# Patient Record
Sex: MALE | Race: OTHER | HISPANIC OR LATINO | URBAN - METROPOLITAN AREA
[De-identification: names, ages, dates, MRNs, and addresses within clinical notes are randomized per-mention and may not be internally consistent; named-entity substitution may affect disease eponyms.]

---

## 2024-01-19 VITALS — HEIGHT: 66 IN | WEIGHT: 240.08 LBS

## 2024-01-19 RX ORDER — CHLORHEXIDINE GLUCONATE 213 G/1000ML
1 SOLUTION TOPICAL ONCE
Refills: 0 | Status: DISCONTINUED | OUTPATIENT
Start: 2024-01-23 | End: 2024-01-24

## 2024-01-19 RX ORDER — SODIUM CHLORIDE 9 MG/ML
1000 INJECTION INTRAMUSCULAR; INTRAVENOUS; SUBCUTANEOUS
Refills: 0 | Status: DISCONTINUED | OUTPATIENT
Start: 2024-01-23 | End: 2024-01-24

## 2024-01-19 NOTE — H&P ADULT - NSHPLABSRESULTS_GEN_ALL_CORE
14.1   11.17 )-----------( 208      ( 23 Jan 2024 07:44 )             43.9       01-23    140  |  102  |  19  ----------------------------<  182<H>  3.7   |  29  |  1.04    Ca    9.5      23 Jan 2024 07:43  Mg     1.7     01-23    TPro  7.1  /  Alb  3.7  /  TBili  1.2  /  DBili  x   /  AST  28  /  ALT  31  /  AlkPhos  97  01-23      PT/INR - ( 23 Jan 2024 07:44 )   PT: 12.2 sec;   INR: 1.07          PTT - ( 23 Jan 2024 07:44 )  PTT:30.0 sec    CARDIAC MARKERS ( 23 Jan 2024 07:43 )  x     / x     / 70 U/L / x     / 1.2 ng/mL        Urinalysis Basic - ( 23 Jan 2024 07:43 )    Color: x / Appearance: x / SG: x / pH: x  Gluc: 182 mg/dL / Ketone: x  / Bili: x / Urobili: x   Blood: x / Protein: x / Nitrite: x   Leuk Esterase: x / RBC: x / WBC x   Sq Epi: x / Non Sq Epi: x / Bacteria: x        EKG: NSR, non ischemic

## 2024-01-19 NOTE — H&P ADULT - NSICDXFAMILYHX_GEN_ALL_CORE_FT
FAMILY HISTORY:  Sibling  Still living? Unknown  Family history of MI (myocardial infarction), Age at diagnosis: Age Unknown

## 2024-01-19 NOTE — H&P ADULT - HEIGHT IN INCHES
HPI     Patient's age: 42 y.o.        Pt states that  Need an exam in order to get new glasses.    Wears glasses? yes     Wears CLs?:  no                        Headaches?  no  Eye pain/discomfort?  no                                                                                     Flashes?  no  Floaters?  no  Diplopia/Double vision?  no    Patient's Ocular History:         Any eye surgeries? no        Family history of eye disease?  no    Significant patient medical history:         1. Diabetes?  no       If yes, IDDM or NIDDM? no   2. HBP?  Yes, medication and diet control                 ! OTC eyedrops currently using:  Visine - OU PRN   ! Prescription eye meds currently using:  none         Last edited by Amelia Flores, OD on 6/5/2017 10:44 AM. (History)            Assessment /Plan     For exam results, see Encounter Report.    Essential hypertension    MALLIKA (obstructive sleep apnea)    Myopia, bilateral      No retinopathy, monitor yearly    Rx specs, pt prefers SV distance, removes specs for reading    RTC 1 year, sooner PRN               
6

## 2024-01-19 NOTE — H&P ADULT - HISTORY OF PRESENT ILLNESS
INCOMPLETE    Cardiologist: Dr Dixon  Pharmacy:   Escort:    77yo F Vietnamese Speaking M POOR HISTORIAN PMHx known CAD (s/p prior MI and prior PCIs), HTN, HLD, CVA (residual weakness and paresthesias of left side), Depression, Neuropathy, BPH presented to Dr Dixon  for follow up, c/o BERNSTEIN ______. TTE 11/14/23: EF 55%; no other findings. NST per MD note: findings c/w ischemia of lateral wall. In light of patient's risk factors, abnormal NST results, and CCS class ____ anginal/anginal-equivalent symptoms, patient is referred to [facility] for cardiac catheterization w/ possible intervention if clinically indicated.    Cath Hx:  Cardiac cath 1/17/14: PTCA D1  s/p cardiac cath 5/13/16 @[facility]: PCI to LAD and D1; most recent ELLEN D1  Cardiac catheterization (6/6/2016): PTCA/Atherectomy/ELLEN proximal RCA (no stent placed 2/2 tortuosity) Cardiologist - Dr. Dixon  [facility] -   Escort -    78M, Occitan Speaking, poor historian, w/ PMHx HTN, HLD, CAD  w/ prior MI (s/p PCIs), CVA (residual LLE weakness and paresthesias), Depression, Neuropathy and BPH, initially presented to outpt cardiologist c/o BERNSTEIN ______.  TTE 11/14/23: EF 55%; no other findings. NST per MD note: findings c/w ischemia of lateral wall.    In light of patient's risk factors, abnormal NST results, and CCS class ____ anginal/anginal-equivalent symptoms, patient is referred to [facility] for cardiac catheterization w/ possible intervention if clinically indicated.    Cath Hx:  Cardiac cath 1/17/14: PTCA D1  s/p cardiac cath 5/13/16 @[facility] [place]: PCI to LAD and D1; most recent ELLEN D1  Cardiac catheterization (6/6/2016): PTCA/Atherectomy/ELLEN proximal RCA (no stent placed 2/2 tortuosity) Cardiologist - Dr. Dixon  [facility] -   Escort -    78M, Romanian Speaking, poor historian, w/ PMHx HTN, HLD, CAD w/ prior MI, s/p multiple PCIs (most recently 2016 w/ ELLEN pRCA), CVA (residual LLE weakness and paresthesias), Depression, Neuropathy and BPH, initially presented to outpt cardiologist c/o BERNSTEIN ______.  TTE 11/14/23: EF 55%; no other findings. NST per MD note: findings c/w ischemia of lateral wall.    In light of patient's risk factors, abnormal NST results, and CCS class ____ anginal/anginal-equivalent symptoms, patient is referred to [facility] for cardiac catheterization w/ possible intervention if clinically indicated.    Cath Hx:  Cardiac cath 1/17/14: PTCA D1  s/p cardiac cath 5/13/16 @[facility]: PCI to LAD and D1; most recent ELLEN D1  Cardiac catheterization (6/6/2016): PTCA/Atherectomy/ELLEN proximal RCA (no stent placed 2/2 tortuosity) Cardiologist - Dr. Dixon  Pharmacy - [facility] ([phone])  Escort - Daughter    78M, St Helenian Speaking, poor historian, w/ PMHx HTN, HLD, CAD w/ prior MI (s/p PCI LAD and D1), s/p subsequent PCI 2016 (atherectomy/PTCA to pRCA, no stent 2/2 tortuosity), CVA (residual LLE weakness and paresthesias), Depression, Neuropathy and BPH, initially presented to outpt cardiologist c/o BERNSTEIN ______.  TTE 11/14/23: EF 55%; no other findings. NST per MD note: findings c/w ischemia of lateral wall.    In light of patient's risk factors, abnormal NST results, and CCS class ____ anginal/anginal-equivalent symptoms, patient is referred to [facility] for cardiac catheterization w/ possible intervention if clinically indicated.    Cath Hx:  Cardiac cath 1/17/14: PTCA D1  s/p cardiac cath 5/13/16 [place]: PCI to LAD and D1; most recent ELLEN D1  Cardiac catheterization (6/6/2016): PTCA/Atherectomy pRCA (no stent placed 2/2 tortuosity) Cardiologist - Dr. Dixon  Pharmacy - [facility] ([phone])  Escort - Daughter    78M, Comoran Speaking, poor historian, w/ PMHx HTN, HLD, CAD w/ prior MI (s/p PCI LAD and D1), s/p subsequent PCI 2016 (atherectomy/PTCA to pRCA, no stent 2/2 tortuosity), CVA (residual LLE weakness and paresthesias), Depression, Neuropathy and BPH, initially presented to outpt cardiologist c/o progressive BERNSTEIN for a few weeks. Pt also reports increased fatigue and occasional palpitations. He denies any dizziness, syncope, diaphoresis, fatigue, LE edema, orthopnea, PND, N/V, fever, chills or recent sick contact.     TTE 11/14/23: EF 55%, no significant valvular disease. NST (per MD note): findings c/w ischemia of lateral wall.    In light of patient's risk factors, abnormal NST results, and CCS class III anginal/anginal-equivalent symptoms, patient is referred to [facility] for cardiac catheterization w/ possible intervention if clinically indicated.    Cath Hx:  Cardiac cath 1/17/14: PTCA D1  s/p cardiac cath 5/13/16 @[facility]: PCI to LAD and D1; most recent ELLEN D1  Cardiac catheterization (6/6/2016): PTCA/Atherectomy pRCA (no stent placed 2/2 tortuosity)

## 2024-01-19 NOTE — H&P ADULT - ASSESSMENT
78M, Mohawk Speaking, poor historian, w/ PMHx HTN, HLD, CAD w/ prior MI (s/p PCI LAD and D1), s/p subsequent PCI 2016 (atherectomy/PTCA to pRCA, no stent 2/2 tortuosity), CVA (residual LLE weakness and paresthesias), Depression, Neuropathy and BPH, now presents to [facility] for recommended cardiac cath w/ possible intervention if clinically indicated, in light of pts risk factors, CCS class III anginal symptoms and abnormal NST.    - ASA 3, Mallampati 3  - Pt compliant w/ home ASA 81mg and Plavix 75mg, last dose 1/22. Ordered for home ASA 81mg and Plavix 75mg prior to cath  - Pre cath IVF Hydration: NS 250cc bolus then c/w maintenance NS @ 75cc/hr x 2hrs  - Pre cath consented w/ language line solutions #[id number]    Risks & benefits of procedure and alternative therapy have been explained to the patient including but not limited to: allergic reaction, bleeding w/possible need for blood transfusion, infection, renal and vascular compromise, limb damage, arrhythmia, stroke, vessel dissection/perforation, Myocardial infarction, emergent CABG. Informed consent obtained and in chart.  78M, Sami Speaking, poor historian, w/ PMHx HTN, HLD, CAD w/ prior MI (s/p PCI LAD and D1), s/p subsequent PCI 2016 (atherectomy/PTCA to pRCA, no stent 2/2 tortuosity), CVA (residual LLE weakness and paresthesias), Depression, Neuropathy and BPH, now presents to [facility] for recommended cardiac cath w/ possible intervention if clinically indicated, in light of pts risk factors, CCS class III anginal symptoms and abnormal NST.    - ASA 3, Mallampati 3  - Pt compliant w/ home ASA 81mg and Plavix 75mg, last dose 1/22. Ordered for home ASA 81mg and Plavix 75mg prior to cath  - Pre cath IVF Hydration: NS 250cc bolus then c/w maintenance NS @ 75cc/hr x 2hrs  - K 3.7 and Mag 1.7, supplement w/ Potassium PO 40mEq x 1 and Magnesium PO 800mg x1  - Pre cath consented w/ language [employer] #[id number]    Risks & benefits of procedure and alternative therapy have been explained to the patient including but not limited to: allergic reaction, bleeding w/possible need for blood transfusion, infection, renal and vascular compromise, limb damage, arrhythmia, stroke, vessel dissection/perforation, Myocardial infarction, emergent CABG. Informed consent obtained and in chart.

## 2024-01-19 NOTE — H&P ADULT - NSICDXPASTMEDICALHX_GEN_ALL_CORE_FT
PAST MEDICAL HISTORY:  BPH (benign prostatic hypertrophy)     CAD (coronary artery disease)     CVA (cerebral vascular accident)     Depression     Hyperlipidemia     Hypertension     Neuropathy

## 2024-01-19 NOTE — H&P ADULT - NSHPPOAPULMEMBOLUS_GEN_A_CORE
Case Management/ Progression of Care    227 PM:  Received notice  From [address]yer, [phone] Insurance has denied LTAC/ [facility].     This CM contacted Dr. Dmitri Porter to schedule a peer for today, and Information given to Star to Coordina no

## 2024-01-23 ENCOUNTER — INPATIENT (INPATIENT)
Facility: HOSPITAL | Age: 79
LOS: 0 days | Discharge: HOME | DRG: 251 | End: 2024-01-24
Attending: STUDENT IN AN ORGANIZED HEALTH CARE EDUCATION/TRAINING PROGRAM | Admitting: STUDENT IN AN ORGANIZED HEALTH CARE EDUCATION/TRAINING PROGRAM
Payer: MEDICARE

## 2024-01-23 DIAGNOSIS — Z98.89 OTHER SPECIFIED POSTPROCEDURAL STATES: Chronic | ICD-10-CM

## 2024-01-23 LAB
A1C WITH ESTIMATED AVERAGE GLUCOSE RESULT: 7 % — HIGH (ref 4–5.6)
ALBUMIN SERPL ELPH-MCNC: 3.7 G/DL — SIGNIFICANT CHANGE UP (ref 3.3–5)
ALP SERPL-CCNC: 97 U/L — SIGNIFICANT CHANGE UP (ref 40–120)
ALT FLD-CCNC: 31 U/L — SIGNIFICANT CHANGE UP (ref 10–45)
ANION GAP SERPL CALC-SCNC: 9 MMOL/L — SIGNIFICANT CHANGE UP (ref 5–17)
APTT BLD: 30 SEC — SIGNIFICANT CHANGE UP (ref 24.5–35.6)
AST SERPL-CCNC: 28 U/L — SIGNIFICANT CHANGE UP (ref 10–40)
BASOPHILS # BLD AUTO: 0.06 K/UL — SIGNIFICANT CHANGE UP (ref 0–0.2)
BASOPHILS NFR BLD AUTO: 0.5 % — SIGNIFICANT CHANGE UP (ref 0–2)
BILIRUB SERPL-MCNC: 1.2 MG/DL — SIGNIFICANT CHANGE UP (ref 0.2–1.2)
BUN SERPL-MCNC: 19 MG/DL — SIGNIFICANT CHANGE UP (ref 7–23)
CALCIUM SERPL-MCNC: 9.5 MG/DL — SIGNIFICANT CHANGE UP (ref 8.4–10.5)
CHLORIDE SERPL-SCNC: 102 MMOL/L — SIGNIFICANT CHANGE UP (ref 96–108)
CHOLEST SERPL-MCNC: 101 MG/DL — SIGNIFICANT CHANGE UP
CK MB CFR SERPL CALC: 1.2 NG/ML — SIGNIFICANT CHANGE UP (ref 0–6.7)
CK SERPL-CCNC: 70 U/L — SIGNIFICANT CHANGE UP (ref 30–200)
CO2 SERPL-SCNC: 29 MMOL/L — SIGNIFICANT CHANGE UP (ref 22–31)
CREAT SERPL-MCNC: 1.04 MG/DL — SIGNIFICANT CHANGE UP (ref 0.5–1.3)
EGFR: 74 ML/MIN/1.73M2 — SIGNIFICANT CHANGE UP
EOSINOPHIL # BLD AUTO: 0.09 K/UL — SIGNIFICANT CHANGE UP (ref 0–0.5)
EOSINOPHIL NFR BLD AUTO: 0.8 % — SIGNIFICANT CHANGE UP (ref 0–6)
ESTIMATED AVERAGE GLUCOSE: 154 MG/DL — HIGH (ref 68–114)
GLUCOSE SERPL-MCNC: 182 MG/DL — HIGH (ref 70–99)
HCT VFR BLD CALC: 43.9 % — SIGNIFICANT CHANGE UP (ref 39–50)
HCV AB S/CO SERPL IA: 0.03 S/CO — SIGNIFICANT CHANGE UP
HCV AB SERPL-IMP: SIGNIFICANT CHANGE UP
HDLC SERPL-MCNC: 42 MG/DL — SIGNIFICANT CHANGE UP
HGB BLD-MCNC: 14.1 G/DL — SIGNIFICANT CHANGE UP (ref 13–17)
IMM GRANULOCYTES NFR BLD AUTO: 0.3 % — SIGNIFICANT CHANGE UP (ref 0–0.9)
INR BLD: 1.07 — SIGNIFICANT CHANGE UP (ref 0.85–1.18)
ISTAT ACTK (ACTIVATED CLOTTING TIME KAOLIN): 380 SEC — HIGH (ref 74–137)
LIPID PNL WITH DIRECT LDL SERPL: 33 MG/DL — SIGNIFICANT CHANGE UP
LYMPHOCYTES # BLD AUTO: 36.6 % — SIGNIFICANT CHANGE UP (ref 13–44)
LYMPHOCYTES # BLD AUTO: 4.09 K/UL — HIGH (ref 1–3.3)
MAGNESIUM SERPL-MCNC: 1.7 MG/DL — SIGNIFICANT CHANGE UP (ref 1.6–2.6)
MCHC RBC-ENTMCNC: 30 PG — SIGNIFICANT CHANGE UP (ref 27–34)
MCHC RBC-ENTMCNC: 32.1 GM/DL — SIGNIFICANT CHANGE UP (ref 32–36)
MCV RBC AUTO: 93.4 FL — SIGNIFICANT CHANGE UP (ref 80–100)
MONOCYTES # BLD AUTO: 1.2 K/UL — HIGH (ref 0–0.9)
MONOCYTES NFR BLD AUTO: 10.7 % — SIGNIFICANT CHANGE UP (ref 2–14)
NEUTROPHILS # BLD AUTO: 5.7 K/UL — SIGNIFICANT CHANGE UP (ref 1.8–7.4)
NEUTROPHILS NFR BLD AUTO: 51.1 % — SIGNIFICANT CHANGE UP (ref 43–77)
NON HDL CHOLESTEROL: 59 MG/DL — SIGNIFICANT CHANGE UP
NRBC # BLD: 0 /100 WBCS — SIGNIFICANT CHANGE UP (ref 0–0)
PLATELET # BLD AUTO: 208 K/UL — SIGNIFICANT CHANGE UP (ref 150–400)
POTASSIUM SERPL-MCNC: 3.7 MMOL/L — SIGNIFICANT CHANGE UP (ref 3.5–5.3)
POTASSIUM SERPL-SCNC: 3.7 MMOL/L — SIGNIFICANT CHANGE UP (ref 3.5–5.3)
PROT SERPL-MCNC: 7.1 G/DL — SIGNIFICANT CHANGE UP (ref 6–8.3)
PROTHROM AB SERPL-ACNC: 12.2 SEC — SIGNIFICANT CHANGE UP (ref 9.5–13)
RBC # BLD: 4.7 M/UL — SIGNIFICANT CHANGE UP (ref 4.2–5.8)
RBC # FLD: 12 % — SIGNIFICANT CHANGE UP (ref 10.3–14.5)
SODIUM SERPL-SCNC: 140 MMOL/L — SIGNIFICANT CHANGE UP (ref 135–145)
TRIGL SERPL-MCNC: 130 MG/DL — SIGNIFICANT CHANGE UP
WBC # BLD: 11.17 K/UL — HIGH (ref 3.8–10.5)
WBC # FLD AUTO: 11.17 K/UL — HIGH (ref 3.8–10.5)

## 2024-01-23 PROCEDURE — 93010 ELECTROCARDIOGRAM REPORT: CPT

## 2024-01-23 PROCEDURE — 92920 PRQ TRLUML C ANGIOP 1ART&/BR: CPT | Mod: LD

## 2024-01-23 PROCEDURE — 93454 CORONARY ARTERY ANGIO S&I: CPT | Mod: 26,59

## 2024-01-23 PROCEDURE — 92978 ENDOLUMINL IVUS OCT C 1ST: CPT | Mod: 26,LD

## 2024-01-23 PROCEDURE — 99152 MOD SED SAME PHYS/QHP 5/>YRS: CPT

## 2024-01-23 RX ORDER — NIFEDIPINE 30 MG
60 TABLET, EXTENDED RELEASE 24 HR ORAL DAILY
Refills: 0 | Status: DISCONTINUED | OUTPATIENT
Start: 2024-01-23 | End: 2024-01-24

## 2024-01-23 RX ORDER — SODIUM CHLORIDE 9 MG/ML
250 INJECTION INTRAMUSCULAR; INTRAVENOUS; SUBCUTANEOUS ONCE
Refills: 0 | Status: COMPLETED | OUTPATIENT
Start: 2024-01-23 | End: 2024-01-23

## 2024-01-23 RX ORDER — MAGNESIUM OXIDE 400 MG ORAL TABLET 241.3 MG
800 TABLET ORAL ONCE
Refills: 0 | Status: COMPLETED | OUTPATIENT
Start: 2024-01-23 | End: 2024-01-23

## 2024-01-23 RX ORDER — METOPROLOL TARTRATE 50 MG
1 TABLET ORAL
Refills: 0 | DISCHARGE

## 2024-01-23 RX ORDER — NIFEDIPINE 30 MG
1 TABLET, EXTENDED RELEASE 24 HR ORAL
Refills: 0 | DISCHARGE

## 2024-01-23 RX ORDER — HYDROCHLOROTHIAZIDE 25 MG
1 TABLET ORAL
Refills: 0 | DISCHARGE

## 2024-01-23 RX ORDER — TAMSULOSIN HYDROCHLORIDE 0.4 MG/1
0.4 CAPSULE ORAL AT BEDTIME
Refills: 0 | Status: DISCONTINUED | OUTPATIENT
Start: 2024-01-23 | End: 2024-01-24

## 2024-01-23 RX ORDER — TAMSULOSIN HYDROCHLORIDE 0.4 MG/1
1 CAPSULE ORAL
Refills: 0 | DISCHARGE

## 2024-01-23 RX ORDER — CLOPIDOGREL BISULFATE 75 MG/1
75 TABLET, FILM COATED ORAL ONCE
Refills: 0 | Status: COMPLETED | OUTPATIENT
Start: 2024-01-23 | End: 2024-01-23

## 2024-01-23 RX ORDER — CLOPIDOGREL BISULFATE 75 MG/1
75 TABLET, FILM COATED ORAL DAILY
Refills: 0 | Status: DISCONTINUED | OUTPATIENT
Start: 2024-01-24 | End: 2024-01-24

## 2024-01-23 RX ORDER — METOPROLOL TARTRATE 50 MG
25 TABLET ORAL DAILY
Refills: 0 | Status: DISCONTINUED | OUTPATIENT
Start: 2024-01-23 | End: 2024-01-24

## 2024-01-23 RX ORDER — ATORVASTATIN CALCIUM 80 MG/1
1 TABLET, FILM COATED ORAL
Refills: 0 | DISCHARGE

## 2024-01-23 RX ORDER — RAMIPRIL 5 MG
1 CAPSULE ORAL
Refills: 0 | DISCHARGE

## 2024-01-23 RX ORDER — ATORVASTATIN CALCIUM 80 MG/1
80 TABLET, FILM COATED ORAL AT BEDTIME
Refills: 0 | Status: DISCONTINUED | OUTPATIENT
Start: 2024-01-23 | End: 2024-01-24

## 2024-01-23 RX ORDER — ASPIRIN/CALCIUM CARB/MAGNESIUM 324 MG
81 TABLET ORAL DAILY
Refills: 0 | Status: DISCONTINUED | OUTPATIENT
Start: 2024-01-24 | End: 2024-01-24

## 2024-01-23 RX ORDER — POTASSIUM CHLORIDE 20 MEQ
40 PACKET (EA) ORAL ONCE
Refills: 0 | Status: COMPLETED | OUTPATIENT
Start: 2024-01-23 | End: 2024-01-23

## 2024-01-23 RX ORDER — LISINOPRIL 2.5 MG/1
20 TABLET ORAL DAILY
Refills: 0 | Status: DISCONTINUED | OUTPATIENT
Start: 2024-01-23 | End: 2024-01-24

## 2024-01-23 RX ORDER — SERTRALINE 25 MG/1
1 TABLET, FILM COATED ORAL
Refills: 0 | DISCHARGE

## 2024-01-23 RX ORDER — SERTRALINE 25 MG/1
25 TABLET, FILM COATED ORAL DAILY
Refills: 0 | Status: DISCONTINUED | OUTPATIENT
Start: 2024-01-23 | End: 2024-01-24

## 2024-01-23 RX ORDER — GABAPENTIN 400 MG/1
300 CAPSULE ORAL THREE TIMES A DAY
Refills: 0 | Status: DISCONTINUED | OUTPATIENT
Start: 2024-01-23 | End: 2024-01-24

## 2024-01-23 RX ORDER — SODIUM CHLORIDE 9 MG/ML
1000 INJECTION INTRAMUSCULAR; INTRAVENOUS; SUBCUTANEOUS
Refills: 0 | Status: DISCONTINUED | OUTPATIENT
Start: 2024-01-23 | End: 2024-01-24

## 2024-01-23 RX ORDER — GABAPENTIN 400 MG/1
1 CAPSULE ORAL
Refills: 0 | DISCHARGE

## 2024-01-23 RX ORDER — ASPIRIN/CALCIUM CARB/MAGNESIUM 324 MG
81 TABLET ORAL ONCE
Refills: 0 | Status: COMPLETED | OUTPATIENT
Start: 2024-01-23 | End: 2024-01-23

## 2024-01-23 RX ADMIN — Medication 25 MILLIGRAM(S): at 21:45

## 2024-01-23 RX ADMIN — Medication 81 MILLIGRAM(S): at 08:11

## 2024-01-23 RX ADMIN — SERTRALINE 25 MILLIGRAM(S): 25 TABLET, FILM COATED ORAL at 15:24

## 2024-01-23 RX ADMIN — ATORVASTATIN CALCIUM 80 MILLIGRAM(S): 80 TABLET, FILM COATED ORAL at 21:43

## 2024-01-23 RX ADMIN — SODIUM CHLORIDE 1000 MILLILITER(S): 9 INJECTION INTRAMUSCULAR; INTRAVENOUS; SUBCUTANEOUS at 08:12

## 2024-01-23 RX ADMIN — SODIUM CHLORIDE 75 MILLILITER(S): 9 INJECTION INTRAMUSCULAR; INTRAVENOUS; SUBCUTANEOUS at 08:11

## 2024-01-23 RX ADMIN — CLOPIDOGREL BISULFATE 75 MILLIGRAM(S): 75 TABLET, FILM COATED ORAL at 08:11

## 2024-01-23 RX ADMIN — TAMSULOSIN HYDROCHLORIDE 0.4 MILLIGRAM(S): 0.4 CAPSULE ORAL at 21:43

## 2024-01-23 RX ADMIN — LISINOPRIL 20 MILLIGRAM(S): 2.5 TABLET ORAL at 21:45

## 2024-01-23 RX ADMIN — MAGNESIUM OXIDE 400 MG ORAL TABLET 800 MILLIGRAM(S): 241.3 TABLET ORAL at 11:08

## 2024-01-23 RX ADMIN — Medication 40 MILLIEQUIVALENT(S): at 11:08

## 2024-01-23 RX ADMIN — GABAPENTIN 300 MILLIGRAM(S): 400 CAPSULE ORAL at 15:23

## 2024-01-23 RX ADMIN — GABAPENTIN 300 MILLIGRAM(S): 400 CAPSULE ORAL at 21:43

## 2024-01-23 RX ADMIN — SODIUM CHLORIDE 215 MILLILITER(S): 9 INJECTION INTRAMUSCULAR; INTRAVENOUS; SUBCUTANEOUS at 11:09

## 2024-01-23 NOTE — DISCHARGE NOTE PROVIDER - HOSPITAL COURSE
78M, Lao Speaking, poor historian, w/ PMHx HTN, HLD, CAD w/ prior MI (s/p PCI LAD and D1), s/p subsequent PCI 2016 (atherectomy/PTCA to pRCA, no stent 2/2 tortuosity), CVA (residual LLE weakness and paresthesias), Depression, Neuropathy and BPH whom in light of patient's risk factors, abnormal NST results, and CCS class III anginal/anginal-equivalent symptoms, patient is referred to [facility] for cardiac catheterization w/ possible intervention if clinically indicated.    Pt now s/p C 1/23/24 revealing mLAD with 80% ISR s/p successful ballooning. LM: mild atherosclerosis, pLAD 40-50% stenosis IVUS MLA >4.5mm2, D1 mild diffuse dz, patent stent in the mid segment, LCX with mild diffuse dz, small to medium size vessel, RCA large dominant, patent stent in pRCA, mild to mod diffuse dz in mid and distal RCA with areas of ectasia. RPDA mild diffuse dz, access right radial.    Pt admitted overnight for observation and telemetry monitoring. Pt seen and examined at bedside this AM without any complaints or events overnight, VSS, labs and telemetry reviewed and pt stable for discharge as discussed with Dr. Alonso. Pt has received appropriate discharge instructions, including medication regimen, access site management and follow up with Dr. Dixon in 1-2 weeks.    Discharge medications: Aspirin 81mg QD, Plavix 75mg QD, Toprol XL 25 mg QD, HCTZ 12.5 mg QD, ramipril 5 mg QD, nifedipine 60 mg QD, sertraline 25 mg QD, gabapentin 300 mg TID, atorvastatin 80 mg QHS, flomax 0.4 mg QD    Cardiac Rehab (Post PCI): Education on benefits of Cardiac Rehab provided to patient: Yes, Referral and Prescription Given for Cardiac Rehab: Yes, Pt given list of locations & instructed to contact their insurance company to review list of participating providers.    Pt discharge copies detail cardiovascular history, medications, testing/treatments, OR patient has created a patient portal account and instructed to provide their records at their 1st appointment.   78M, Tajik Speaking, poor historian, w/ PMHx HTN, HLD, CAD w/ prior MI (s/p PCI LAD and D1), s/p subsequent PCI 2016 (atherectomy/PTCA to pRCA, no stent 2/2 tortuosity), CVA (residual LLE weakness and paresthesias), Depression, Neuropathy and BPH whom in light of patient's risk factors, abnormal NST results, and CCS class III anginal/anginal-equivalent symptoms, patient is referred to [facility] for cardiac catheterization w/ possible intervention if clinically indicated.    Pt now s/p C 1/23/24 revealing mLAD with 80% ISR s/p successful ballooning. LM: mild atherosclerosis, pLAD 40-50% stenosis IVUS MLA >4.5mm2, D1 mild diffuse dz, patent stent in the mid segment, LCX with mild diffuse dz, small to medium size vessel, RCA large dominant, patent stent in pRCA, mild to mod diffuse dz in mid and distal RCA with areas of ectasia. RPDA mild diffuse dz, access right radial.    Pt admitted overnight for observation and telemetry monitoring. Pt seen and examined at bedside this AM,  #[id number] used, without any complaints or events overnight, VSS, labs and telemetry reviewed and pt stable for discharge as discussed with Dr. Alonso. Pt has received appropriate discharge instructions, including medication regimen, access site management and follow up with Dr. Dixon in 1-2 weeks.    Discharge medications: Aspirin 81mg QD, Plavix 75mg QD, Toprol XL 25 mg QD, HCTZ 12.5 mg QD, ramipril 5 mg QD, nifedipine 60 mg QD, sertraline 25 mg QD, gabapentin 300 mg TID, atorvastatin 80 mg QHS, flomax 0.4 mg QD    Cardiac Rehab (Post PCI): Education on benefits of Cardiac Rehab provided to patient: Yes, Referral and Prescription Given for Cardiac Rehab: Yes, Pt given list of locations & instructed to contact their insurance company to review list of participating providers.    Pt discharge copies detail cardiovascular history, medications, testing/treatments, OR patient has created a patient portal account and instructed to provide their records at their 1st appointment.

## 2024-01-23 NOTE — PATIENT PROFILE ADULT - FUNCTIONAL ASSESSMENT - BASIC MOBILITY 6.
3-calculated by average/Not able to assess (calculate score using [facility] averaging method)

## 2024-01-23 NOTE — PATIENT PROFILE ADULT - FUNCTIONAL ASSESSMENT - BASIC MOBILITY 1.
CARDIOLOGY PROGRESS NOTE    Subjective/24 hour events:   No overnight events.   Denies chest pain, palpitations, SOB, lightheadedness, dizziness.    Telemetry:    MEDICATIONS  (STANDING):  albuterol/ipratropium for Nebulization 3 milliLiter(s) Nebulizer every 6 hours  aspirin enteric coated 81 milliGRAM(s) Oral daily  atorvastatin 40 milliGRAM(s) Oral at bedtime  cefTRIAXone   IVPB 1000 milliGRAM(s) IV Intermittent every 24 hours  clopidogrel Tablet 75 milliGRAM(s) Oral daily  influenza   Vaccine 0.5 milliLiter(s) IntraMuscular once  sodium chloride 0.9%. 1000 milliLiter(s) (60 mL/Hr) IV Continuous <Continuous>    Vital Signs Last 24 Hrs  T(C): 36.7 (10 Nov 2019 04:55), Max: 37.4 (09 Nov 2019 21:10)  T(F): 98 (10 Nov 2019 04:55), Max: 99.3 (09 Nov 2019 21:10)  HR: 58 (10 Nov 2019 04:55) (53 - 64)  BP: 113/65 (10 Nov 2019 04:55) (95/62 - 130/73)  BP(mean): --  RR: 18 (10 Nov 2019 04:55) (18 - 18)  SpO2: 92% (10 Nov 2019 04:55) (91% - 98%)    I&O's Summary    09 Nov 2019 07:01  -  10 Nov 2019 07:00  --------------------------------------------------------  IN: 360 mL / OUT: 675 mL / NET: -315 mL    Physical Exam:  Appearance: Comfortable  HEENT: EOMI	  Neck: Supple. No masses. JVP ___ cm H2O  Chest: Clear to auscultation bilaterally  CV: RRR. Normal S1 and S2. No murmurs, rubs, or gallops. No edema.  Abdomen: Soft, non-distended, non-tender  Skin: No rashes, ecchymoses, or skin breakdown  Extremities: Warm  Neuro: Alert and oriented x 3. No focal deficits.  Psych: Mood and affect appropriate    Labs:             10.6   9.28  )-----------( 185      ( 09 Nov 2019 04:03 )             31.2     11-10    135  |  99  |  26<H>  ----------------------------<  131<H>  3.8   |  22  |  1.22    Ca    8.5      10 Nov 2019 06:04  Phos  3.8     11-10  Mg     1.8     11-10    TPro  6.0  /  Alb  3.1<L>  /  TBili  0.7  /  DBili  x   /  AST  65<H>  /  ALT  69<H>  /  AlkPhos  215<H>  11-10    PTT - ( 08 Nov 2019 14:35 )  PTT:48.9 sec        pro-BNP: Serum Pro-Brain Natriuretic Peptide: 3206 pg/mL (11-06 @ 17:29)    Lipid Profile:   HgA1c:   TSH:     CARDIOVASCULAR DIAGNOSTIC TESTING:  ECG:     [] Echocardiogram:  [] Stress Test:  [] Catheterization:    RADIOLOGY: CARDIOLOGY PROGRESS NOTE    Subjective/24 hour events:   No overnight events.   Denies chest pain, palpitations, SOB, lightheadedness, dizziness.    Telemetry: SB, 50-60    MEDICATIONS  (STANDING):  albuterol/ipratropium for Nebulization 3 milliLiter(s) Nebulizer every 6 hours  aspirin enteric coated 81 milliGRAM(s) Oral daily  atorvastatin 40 milliGRAM(s) Oral at bedtime  cefTRIAXone   IVPB 1000 milliGRAM(s) IV Intermittent every 24 hours  clopidogrel Tablet 75 milliGRAM(s) Oral daily  influenza   Vaccine 0.5 milliLiter(s) IntraMuscular once  sodium chloride 0.9%. 1000 milliLiter(s) (60 mL/Hr) IV Continuous <Continuous>    Vital Signs Last 24 Hrs  T(C): 36.7 (10 Nov 2019 04:55), Max: 37.4 (09 Nov 2019 21:10)  T(F): 98 (10 Nov 2019 04:55), Max: 99.3 (09 Nov 2019 21:10)  HR: 58 (10 Nov 2019 04:55) (53 - 64)  BP: 113/65 (10 Nov 2019 04:55) (95/62 - 130/73)  BP(mean): --  RR: 18 (10 Nov 2019 04:55) (18 - 18)  SpO2: 92% (10 Nov 2019 04:55) (91% - 98%)    I&O's Summary  09 Nov 2019 07:01  -  10 Nov 2019 07:00  --------------------------------------------------------  IN: 360 mL / OUT: 675 mL / NET: -315 mL    Physical Exam:  Appearance: Comfortable  HEENT: EOMI	  Neck: Supple. No masses.   Chest: +wheeze  CV: RRR. Normal S1 and S2. No murmurs, rubs, or gallops. No edema.  Abdomen: Soft, non-distended, non-tender  Skin: No rashes, ecchymoses, or skin breakdown  Extremities: Warm  Neuro: Alert and oriented x 3. No focal deficits.  Psych: Mood and affect appropriate    Labs:             10.6   9.28  )-----------( 185      ( 09 Nov 2019 04:03 )             31.2     11-10    135  |  99  |  26<H>  ----------------------------<  131<H>  3.8   |  22  |  1.22    Ca    8.5      10 Nov 2019 06:04  Phos  3.8     11-10  Mg     1.8     11-10    TPro  6.0  /  Alb  3.1<L>  /  TBili  0.7  /  DBili  x   /  AST  65<H>  /  ALT  69<H>  /  AlkPhos  215<H>  11-10  PTT - ( 08 Nov 2019 14:35 )  PTT:48.9 sec 3 = A little assistance

## 2024-01-23 NOTE — DISCHARGE NOTE PROVIDER - NSDCFUADDINST_GEN_ALL_CORE_FT
Please continue to follow a heart healthy diet, low in sodium, cholesterol and fats. We recommend a Mediterranean diet:  -Incorporate 2 or more servings per day of vegetables, fruits, and whole grains  -Increase intake of fish and legumes/beans to 2 or more servings per week  -Increase intake of healthy fats, such as olive oil and avocados, and have a handful of nuts/seeds most days  -Reduce red/processed meat consumption to 2 or fewer times per week    Aspirin and Plavix can put you at increased risk of bleeding; please avoid NSAIDS (such as Motrin, Advil, Ibuprofen, Naproxen, or Aleve, as these medications may further your risk of bleeding

## 2024-01-23 NOTE — DISCHARGE NOTE PROVIDER - NSDCCPCAREPLAN_GEN_ALL_CORE_FT
PRINCIPAL DISCHARGE DIAGNOSIS  Diagnosis: CAD (coronary artery disease)  Assessment and Plan of Treatment: You were found to have blockages in the arteries of your heart, also known as Coronary Artery Disease. You underwent a cardiac catheterization on 1/23/24 and received ballooning of your left descending artery that was blocked to open it up.  PLEASE CONTINUE ASPIRIN 81MG DAILY AND PLAVIX 75MG DAILY. DO NOT STOP THESE MEDICATIONS FOR ANY REASON AS THEY ARE KEEPING YOUR STENT OPEN AND PREVENTING A HEART ATTACK.   Avoid strenuous activity or heavy lifting anything more than 5lbs for the next five days. Do not take a bath or swim for the next five days; you may shower. For any bleeding or hematoma formation (hardened blood collection under the skin) at the access site of your wrist please hold pressure and go to the emergency room. Please follow up with Dr. Alonso in 1-2 weeks. For recurrent chest pain, please call your doctor or go to the emergency room.        SECONDARY DISCHARGE DIAGNOSES  Diagnosis: Hypertension  Assessment and Plan of Treatment: Please continue Toprol XL 25 mg QD, HCTZ 12.5 mg QD, ramipril 5 mg QD, nifedipine 60 mg QD as listed to keep your blood pressure controlled. For blood pressure that is too high or too low please see your doctor or go to the emergency room as necessary.      Diagnosis: Dyslipidemia  Assessment and Plan of Treatment: Please continue atorvastatin 80 mg QHS at bedtime to keep your cholesterol low. High cholesterol contributes to heart disease.

## 2024-01-23 NOTE — DISCHARGE NOTE PROVIDER - NSDCMRMEDTOKEN_GEN_ALL_CORE_FT
Aspirin EC 81 mg oral delayed release tablet: 1 tab(s) orally once a day  atorvastatin 80 mg oral tablet: 1 tab(s) orally once a day  Flomax 0.4 mg oral capsule: 1 cap(s) orally once a day  gabapentin 300 mg oral tablet: 1 tab(s) orally 3 times a day  hydroCHLOROthiazide 12.5 mg oral tablet: 1 tab(s) orally once a day  metoprolol succinate 25 mg oral tablet, extended release: 1 tab(s) orally once a day  NIFEdipine (Eqv-Procardia XL) 60 mg oral tablet, extended release: 1 tab(s) orally once a day  Plavix 75 mg oral tablet: 1 tab(s) orally once a day  ramipril 5 mg oral capsule: 1 cap(s) orally once a day  sertraline 25 mg oral tablet: 1 tab(s) orally once a day   Aspirin EC 81 mg oral delayed release tablet: 1 tab(s) orally once a day  atorvastatin 80 mg oral tablet: 1 tab(s) orally once a day  Cardiac Rehab: You should attend cardiac rehab 3 times per week for 12 weeks. We have provided you with a list of nearby facilities. Please call your insurance carrier to determine which of these facilities are covered under your plan.  Flomax 0.4 mg oral capsule: 1 cap(s) orally once a day  gabapentin 300 mg oral tablet: 1 tab(s) orally 3 times a day  hydroCHLOROthiazide 12.5 mg oral tablet: 1 tab(s) orally once a day  metoprolol succinate 25 mg oral tablet, extended release: 1 tab(s) orally once a day  NIFEdipine (Eqv-Procardia XL) 60 mg oral tablet, extended release: 1 tab(s) orally once a day  Plavix 75 mg oral tablet: 1 tab(s) orally once a day  ramipril 5 mg oral capsule: 1 cap(s) orally once a day  sertraline 25 mg oral tablet: 1 tab(s) orally once a day

## 2024-01-23 NOTE — DISCHARGE NOTE PROVIDER - CARE PROVIDER_API CALL
Naresh Dixon  Interventional Cardiology  [address], [address]  [place] [address]  Phone: [phone]  Fax: [phone]  Established Patient  Follow Up Time: 2 weeks

## 2024-01-24 VITALS
HEART RATE: 60 BPM | DIASTOLIC BLOOD PRESSURE: 68 MMHG | SYSTOLIC BLOOD PRESSURE: 125 MMHG | RESPIRATION RATE: 17 BRPM | OXYGEN SATURATION: 96 %

## 2024-01-24 LAB
ANION GAP SERPL CALC-SCNC: 6 MMOL/L — SIGNIFICANT CHANGE UP (ref 5–17)
BUN SERPL-MCNC: 16 MG/DL — SIGNIFICANT CHANGE UP (ref 7–23)
CALCIUM SERPL-MCNC: 8.7 MG/DL — SIGNIFICANT CHANGE UP (ref 8.4–10.5)
CHLORIDE SERPL-SCNC: 105 MMOL/L — SIGNIFICANT CHANGE UP (ref 96–108)
CO2 SERPL-SCNC: 25 MMOL/L — SIGNIFICANT CHANGE UP (ref 22–31)
CREAT SERPL-MCNC: 0.98 MG/DL — SIGNIFICANT CHANGE UP (ref 0.5–1.3)
EGFR: 79 ML/MIN/1.73M2 — SIGNIFICANT CHANGE UP
GLUCOSE SERPL-MCNC: 145 MG/DL — HIGH (ref 70–99)
HCT VFR BLD CALC: 40.6 % — SIGNIFICANT CHANGE UP (ref 39–50)
HGB BLD-MCNC: 13.2 G/DL — SIGNIFICANT CHANGE UP (ref 13–17)
MAGNESIUM SERPL-MCNC: 1.6 MG/DL — SIGNIFICANT CHANGE UP (ref 1.6–2.6)
MCHC RBC-ENTMCNC: 30.8 PG — SIGNIFICANT CHANGE UP (ref 27–34)
MCHC RBC-ENTMCNC: 32.5 GM/DL — SIGNIFICANT CHANGE UP (ref 32–36)
MCV RBC AUTO: 94.6 FL — SIGNIFICANT CHANGE UP (ref 80–100)
NRBC # BLD: 0 /100 WBCS — SIGNIFICANT CHANGE UP (ref 0–0)
PLATELET # BLD AUTO: 200 K/UL — SIGNIFICANT CHANGE UP (ref 150–400)
POTASSIUM SERPL-MCNC: 4.1 MMOL/L — SIGNIFICANT CHANGE UP (ref 3.5–5.3)
POTASSIUM SERPL-SCNC: 4.1 MMOL/L — SIGNIFICANT CHANGE UP (ref 3.5–5.3)
RBC # BLD: 4.29 M/UL — SIGNIFICANT CHANGE UP (ref 4.2–5.8)
RBC # FLD: 12.3 % — SIGNIFICANT CHANGE UP (ref 10.3–14.5)
SODIUM SERPL-SCNC: 136 MMOL/L — SIGNIFICANT CHANGE UP (ref 135–145)
WBC # BLD: 8.38 K/UL — SIGNIFICANT CHANGE UP (ref 3.8–10.5)
WBC # FLD AUTO: 8.38 K/UL — SIGNIFICANT CHANGE UP (ref 3.8–10.5)

## 2024-01-24 PROCEDURE — 85025 COMPLETE CBC W/AUTO DIFF WBC: CPT

## 2024-01-24 PROCEDURE — 93005 ELECTROCARDIOGRAM TRACING: CPT

## 2024-01-24 PROCEDURE — 85610 PROTHROMBIN TIME: CPT

## 2024-01-24 PROCEDURE — 85027 COMPLETE CBC AUTOMATED: CPT

## 2024-01-24 PROCEDURE — 80053 COMPREHEN METABOLIC PANEL: CPT

## 2024-01-24 PROCEDURE — 86803 HEPATITIS C AB TEST: CPT

## 2024-01-24 PROCEDURE — 85347 COAGULATION TIME ACTIVATED: CPT

## 2024-01-24 PROCEDURE — 83036 HEMOGLOBIN GLYCOSYLATED A1C: CPT

## 2024-01-24 PROCEDURE — 36415 COLL VENOUS BLD VENIPUNCTURE: CPT

## 2024-01-24 PROCEDURE — C1769: CPT

## 2024-01-24 PROCEDURE — C1894: CPT

## 2024-01-24 PROCEDURE — 80048 BASIC METABOLIC PNL TOTAL CA: CPT

## 2024-01-24 PROCEDURE — C1753: CPT

## 2024-01-24 PROCEDURE — 83735 ASSAY OF MAGNESIUM: CPT

## 2024-01-24 PROCEDURE — 82553 CREATINE MB FRACTION: CPT

## 2024-01-24 PROCEDURE — 99239 HOSP IP/OBS DSCHRG MGMT >30: CPT

## 2024-01-24 PROCEDURE — 82550 ASSAY OF CK (CPK): CPT

## 2024-01-24 PROCEDURE — 85730 THROMBOPLASTIN TIME PARTIAL: CPT

## 2024-01-24 PROCEDURE — C1887: CPT

## 2024-01-24 PROCEDURE — 80061 LIPID PANEL: CPT

## 2024-01-24 PROCEDURE — C1725: CPT

## 2024-01-24 RX ORDER — CLOPIDOGREL BISULFATE 75 MG/1
1 TABLET, FILM COATED ORAL
Refills: 0 | DISCHARGE

## 2024-01-24 RX ORDER — CLOPIDOGREL BISULFATE 75 MG/1
1 TABLET, FILM COATED ORAL
Qty: 30 | Refills: 11
Start: 2024-01-24 | End: 2025-01-17

## 2024-01-24 RX ORDER — ASPIRIN/CALCIUM CARB/MAGNESIUM 324 MG
1 TABLET ORAL
Refills: 0 | DISCHARGE

## 2024-01-24 RX ORDER — ASPIRIN/CALCIUM CARB/MAGNESIUM 324 MG
1 TABLET ORAL
Qty: 30 | Refills: 11
Start: 2024-01-24 | End: 2025-01-17

## 2024-01-24 RX ORDER — MAGNESIUM SULFATE 500 MG/ML
2 VIAL (ML) INJECTION ONCE
Refills: 0 | Status: COMPLETED | OUTPATIENT
Start: 2024-01-24 | End: 2024-01-24

## 2024-01-24 RX ADMIN — GABAPENTIN 300 MILLIGRAM(S): 400 CAPSULE ORAL at 05:57

## 2024-01-24 RX ADMIN — Medication 60 MILLIGRAM(S): at 05:58

## 2024-01-24 RX ADMIN — LISINOPRIL 20 MILLIGRAM(S): 2.5 TABLET ORAL at 05:57

## 2024-01-24 RX ADMIN — SERTRALINE 25 MILLIGRAM(S): 25 TABLET, FILM COATED ORAL at 11:44

## 2024-01-24 RX ADMIN — Medication 25 MILLIGRAM(S): at 11:47

## 2024-01-24 RX ADMIN — GABAPENTIN 300 MILLIGRAM(S): 400 CAPSULE ORAL at 13:36

## 2024-01-24 RX ADMIN — Medication 81 MILLIGRAM(S): at 11:44

## 2024-01-24 RX ADMIN — CLOPIDOGREL BISULFATE 75 MILLIGRAM(S): 75 TABLET, FILM COATED ORAL at 11:43

## 2024-01-24 RX ADMIN — Medication 25 GRAM(S): at 09:02

## 2024-01-24 NOTE — DISCHARGE NOTE NURSING/CASE MANAGEMENT/SOCIAL WORK - PATIENT PORTAL LINK FT
You can access the FollowMyHealth Patient Portal offered by [facility] by registering at the following website: [email or web address]. By joining [employer]’s FollowMyHealth portal, you will also be able to view your health information using other applications (apps) compatible with our system.

## 2024-01-29 DIAGNOSIS — I25.10 ATHEROSCLEROTIC HEART DISEASE OF NATIVE CORONARY ARTERY WITHOUT ANGINA PECTORIS: ICD-10-CM

## 2024-01-29 DIAGNOSIS — I25.84 CORONARY ATHEROSCLEROSIS DUE TO CALCIFIED CORONARY LESION: ICD-10-CM

## 2024-01-29 DIAGNOSIS — Z95.5 PRESENCE OF CORONARY ANGIOPLASTY IMPLANT AND GRAFT: ICD-10-CM

## 2024-01-29 DIAGNOSIS — N40.0 BENIGN PROSTATIC HYPERPLASIA WITHOUT LOWER URINARY TRACT SYMPTOMS: ICD-10-CM

## 2024-01-29 DIAGNOSIS — I25.2 OLD MYOCARDIAL INFARCTION: ICD-10-CM

## 2024-01-29 DIAGNOSIS — Z79.82 LONG TERM (CURRENT) USE OF ASPIRIN: ICD-10-CM

## 2024-01-29 DIAGNOSIS — F32.A DEPRESSION, UNSPECIFIED: ICD-10-CM

## 2024-01-29 DIAGNOSIS — I69.344 MONOPLEGIA OF LOWER LIMB FOLLOWING CEREBRAL INFARCTION AFFECTING LEFT NON-DOMINANT SIDE: ICD-10-CM

## 2024-01-29 DIAGNOSIS — I10 ESSENTIAL (PRIMARY) HYPERTENSION: ICD-10-CM

## 2024-01-29 DIAGNOSIS — E78.5 HYPERLIPIDEMIA, UNSPECIFIED: ICD-10-CM

## 2024-01-29 DIAGNOSIS — G62.9 POLYNEUROPATHY, UNSPECIFIED: ICD-10-CM

## 2024-01-29 DIAGNOSIS — Z79.02 LONG TERM (CURRENT) USE OF ANTITHROMBOTICS/ANTIPLATELETS: ICD-10-CM
